# Patient Record
Sex: MALE | URBAN - METROPOLITAN AREA
[De-identification: names, ages, dates, MRNs, and addresses within clinical notes are randomized per-mention and may not be internally consistent; named-entity substitution may affect disease eponyms.]

---

## 2024-01-01 ENCOUNTER — HOSPITAL ENCOUNTER (EMERGENCY)
Facility: MEDICAL CENTER | Age: 74
End: 2024-04-26
Attending: EMERGENCY MEDICINE

## 2024-01-01 ENCOUNTER — HOSPITAL ENCOUNTER (OUTPATIENT)
Dept: RADIOLOGY | Facility: MEDICAL CENTER | Age: 74
End: 2024-04-26
Attending: PHYSICIAN ASSISTANT

## 2024-01-01 ENCOUNTER — APPOINTMENT (OUTPATIENT)
Dept: RADIOLOGY | Facility: MEDICAL CENTER | Age: 74
End: 2024-01-01
Attending: EMERGENCY MEDICINE

## 2024-01-01 PROCEDURE — 92950 HEART/LUNG RESUSCITATION CPR: CPT

## 2024-01-01 PROCEDURE — 99285 EMERGENCY DEPT VISIT HI MDM: CPT

## 2024-01-01 RX ORDER — NOREPINEPHRINE BITARTRATE 0.03 MG/ML
INJECTION, SOLUTION INTRAVENOUS
Status: DISCONTINUED
Start: 2024-01-01 | End: 2024-04-27 | Stop reason: HOSPADM

## 2024-01-01 RX ORDER — EPINEPHRINE HCL IN 0.9 % NACL 4MG/250ML
PLASTIC BAG, INJECTION (ML) INTRAVENOUS
Status: DISCONTINUED
Start: 2024-01-01 | End: 2024-04-27 | Stop reason: HOSPADM

## 2024-04-27 NOTE — ED PROVIDER NOTES
ED Provider Note    CHIEF COMPLAINT  No chief complaint on file.      EXTERNAL RECORDS REVIEWED  Reviewed records from transferring facility    HPI/ROS  LIMITATION TO HISTORY   Patient is obtunded on arrival  OUTSIDE HISTORIAN(S):  EMS and transferring physician provided history    Alexis Guardado is a 74 y.o. male who presents for level of care.  The patient apparently is relatively high functioning 74-year-old gentleman.  The [facility] did a well check the patient was found at his home with no obvious trauma obtunded with a pulse of around 10.  Initial paramedics apparently transcutaneously paced the patient and did not start chest compressions.  They brought him to a [facility] in [facility] where he was subsequently intubated, central line and arterial line was placed.  Patient was started on epinephrine as well as Levophed infusions.  His pulse and blood pressure improved.  I did accept the patient in transfer.  They only did a cursory workup such as some basic blood tests and a chest x-ray without any imaging such as CT scans of the head chest or abdomen or pelvis.  They felt he required higher level of care and accepted the patient in transfer.  On arrival the [facility] nurses indicated that around 5 minutes prior to arrival he lost his pulses and they started chest compressions.    PAST MEDICAL HISTORY   None    SURGICAL HISTORY  patient denies any surgical history  Unknown  FAMILY HISTORY  No family history on file.  None none  SOCIAL HISTORY  Social History     Tobacco Use    Smoking status: Not on file    Smokeless tobacco: Not on file   Substance and Sexual Activity    Alcohol use: Not on file    Drug use: Not on file    Sexual activity: Not on file     Was in [facility]  CURRENT MEDICATIONS  Home Medications    **Home medications have not yet been reviewed for this encounter**     Patient arrives on epinephrine and Levophed infusion    ALLERGIES  Not on  File    PHYSICAL EXAM  VITAL SIGNS: CPR in progress pulse ox 100%  Pulse ox interpretation: I interpret this pulse ox as normal.  Constitutional: Obtunded intubated   HEENT: Atraumatic normocephalic, pupils 5 mm fixed and dilated.  7.5 endotracheal tube is at 24 cm at the lips   neck: No JVD   cardiovascular: CPR in progress   Thorax & Lungs: Bilateral breath sounds with manual bagging   GI: Mottled abdomen  Skin: Pale mottled   musculoskeletal: No pedal edema no peripheral pulses  Neurologic: GCS 3 T        EKG/LABS  No labs were performed  I have independently interpreted this EKG    RADIOLOGY/PROCEDURES   CPR was performed for greater than 30 minutes:    Radiologist interpretation:  CT-HEAD W/O    (Results Pending)   CT-CTA CHEST PULMONARY ARTERY W/ RECONS    (Results Pending)       COURSE & MEDICAL DECISION MAKING    ASSESSMENT, COURSE AND PLAN  Care Narrative:    This was a critically ill 74-year-old transferred from a small [facility] hospital in [facility].  CPR was in progress upon arrival.  We transition the patient over to her monitors and continued CPR per standard ACLS protocols.  Please see nursing notes for details.  Briefly every 2 minutes to check the pulse and he was either persistent asystole, PEA for approximately 20 minutes of his arrival.  I performed a quick bedside ultrasound which did not demonstrate any large pericardial effusion or obvious right heart strain and there is no cardiac activity.  At around the 20-minute violet he did have brief return of spontaneous circulation that was confirmed with bedside ultrasound, manual palpation of the pulse and arterial waveforms noted on the arterial line that was already in place.  Previous to return of circulation the patient was given several rounds of epinephrine, an amp of bicarb as well as D50.  The patient had transient restoration of pulses but then degenerated to either fine V-fib versus asystole with motion artifact.  He was  defibrillated 1 time and 150 J.  CPR was continued for an additional 10 minutes and after well more than 35 minutes of continuous CPR the patient had no pulse no cardiac activity and no arterial waveform.  I spoke to the patient's mother олег and indicated that despite our best efforts the patient could not be resuscitated and he was declared dead at approximately 8:08 pm.  We were never able to perform extensive diagnostic testing such as repeat laboratory studies or any imaging as the patient was never stable enough for further diagnostic evaluation     Reviewing the laboratory studies from the transferring facility the patient had profound metabolic acidosis with a lactic acid of 14.9 and an arterial pH of 6.64, normal potassium    ADDITIONAL PROBLEMS MANAGED      DISPOSITION AND DISCUSSIONS  I have discussed management of the patient with the following physicians and ALLYSON's: None    Discussion of management with other QHP or appropriate source(s): Discussed with clinical pharmacist    Escalation of care considered, and ultimately not performed: None    Barriers to care at this time, including but not limited to: None.     Decision tools and prescription drugs considered including, but not limited to: None.    FINAL DIAGNOSIS  Cardiopulmonary arrest  Cardiogenic shock  Severe metabolic acidosis  Lactic acidemia       CRITICAL CARE  The very real possibilty of a deterioration of this patient's condition required the highest level of my preparedness for sudden, emergent intervention.  I provided critical care services, which included medication orders, frequent reevaluations of the patient's condition and response to treatment, ordering and reviewing test results, and discussing the case with various consultants.  The critical care time associated with the care of the patient was 40 minutes. Review chart for interventions. This time is exclusive of any other billable procedures.     Electronically signed by: Stas  МАРИНА Andrade M.D., 4/26/2024 8:12 PM

## 2024-04-27 NOTE — ED NOTES
BLOCK CARDIAC ARREST CHARTIN: Pt arrival from [facility], CPR in progress. Per CF, pt had been arresting for 3 min prior to arrival.    - initial pulse check and rhythm, no pulse, asystole, continue compressions  : Pulse check - no cardiac movement via US by ERP, PEA, no pulse    - 1mg epi via central line  : 1 amp bicarb and 1 amp D50  : Pulse check - no pulse, asystole, no pericardial effusion or cardiac function per US by ERP  : Pulse check - no pulse, PEA  : 1mg epi via central line  : 1g calcium via central line  : wide complex agonal rhythm, no pulse, continue CPR  : ROSC, pulses felt at rate of 46, organized rhythm on Zoll  :30: no pulse, PEA  : Pulse check, some cardiac rhythm per US by ERP, + pulse felt, rate of 71  : José Manuel down to 45  : Norepi gtt intiated @ 0.15 via R PIV  : Pulses lost, CPR initiated, unorganized cardiac rhythm per US by ERP   - 1 mg epi via central line, 1 amp bicarb via central line  : Pulse check, no cardiac activity per US by ERP, no pulse  : Epi gtt initiated @ 0.05 via L PIV, rhythm check, no pulse  : Pulse check, no pulse, V fib at 160bpm noted on Zoll   - Defib delivered @ 125J  : Pulse check, disorganized rhythm, TOD called by ERP

## 2024-04-27 NOTE — DISCHARGE PLANNING
Medical Social Work     Cardiac arrest    SW responded to a cardiac arrest. The ER Dr. Andrade provided updates to the pt mother who lives in [place]. The medical team was not able to get pulses back and the ERP called TOD  2008. The ERP notified the pt mother of the pt death via phone. The ERP then requested SW call the pt mother Sangeetha Guardado at [phone] to provided her with emotional support and next steps. SW called Sangeetha Guardado and provided her with emotional support and answered her questions. SW provided the pt mother with the ER SW number so she can call back and provided SW with an email in order to mail her the next steps packet so the pt mother can choose a mortuary.     Sangeetha Mabryfitt (mom) [phone]

## 2024-04-27 NOTE — ED NOTES
Transport at bedside to transport pt to [facility].Transport signature obtained and x1 death paperwork provided to transport staff. Care relinquished.

## 2024-04-27 NOTE — ED TRIAGE NOTES
Alexis [facility]  74 y.o. male  Chief Complaint   Patient presents with    Cardiac Arrest     BIB [facility] as EMS tx from [facility], CPR in progress on arrival.      Vitals:    04/26/24 2100   Pulse: (!) 0   Resp: (!) 0